# Patient Record
Sex: MALE | Race: WHITE | NOT HISPANIC OR LATINO | Employment: OTHER | URBAN - METROPOLITAN AREA
[De-identification: names, ages, dates, MRNs, and addresses within clinical notes are randomized per-mention and may not be internally consistent; named-entity substitution may affect disease eponyms.]

---

## 2017-05-04 ENCOUNTER — COMMUNICATION - HEALTHEAST (OUTPATIENT)
Dept: FAMILY MEDICINE | Facility: CLINIC | Age: 37
End: 2017-05-04

## 2017-05-04 ENCOUNTER — OFFICE VISIT (OUTPATIENT)
Dept: FAMILY MEDICINE | Facility: CLINIC | Age: 37
End: 2017-05-04
Payer: COMMERCIAL

## 2017-05-04 VITALS
DIASTOLIC BLOOD PRESSURE: 72 MMHG | OXYGEN SATURATION: 98 % | TEMPERATURE: 98.6 F | SYSTOLIC BLOOD PRESSURE: 107 MMHG | HEART RATE: 119 BPM | BODY MASS INDEX: 31.53 KG/M2 | WEIGHT: 210.4 LBS

## 2017-05-04 DIAGNOSIS — H65.02 ACUTE SEROUS OTITIS MEDIA OF LEFT EAR, RECURRENCE NOT SPECIFIED: Primary | ICD-10-CM

## 2017-05-04 DIAGNOSIS — K64.4 EXTERNAL HEMORRHOIDS: ICD-10-CM

## 2017-05-04 DIAGNOSIS — Z23 NEED FOR PROPHYLACTIC VACCINATION WITH TETANUS-DIPHTHERIA (TD): ICD-10-CM

## 2017-05-04 PROCEDURE — 99203 OFFICE O/P NEW LOW 30 MIN: CPT | Performed by: INTERNAL MEDICINE

## 2017-05-04 RX ORDER — AZITHROMYCIN 250 MG/1
TABLET, FILM COATED ORAL
Qty: 6 TABLET | Refills: 0 | Status: SHIPPED | OUTPATIENT
Start: 2017-05-04 | End: 2019-12-03

## 2017-05-04 NOTE — MR AVS SNAPSHOT
After Visit Summary   5/4/2017    Jona Lopez    MRN: [id number]           Patient Information     Date Of Birth          1980        Visit Information        Provider Department      5/4/2017 11:50 AM Ann Her MD [facility]        Today's Diagnoses     Need for prophylactic vaccination with tetanus-diphtheria (TD)    -  1    Acute serous otitis media of left ear, recurrence not specified        External hemorrhoids          Care Instructions    Treat this as you would treat a regular cold. If your symptoms are not better in 3 days, start to take the antibiotic.  If your symptoms become worse in the next 3 days, start to take the antibiotic. Otherwise, do not take the antibiotic.    For your hemorrhoids:  Please use the cream for up to 2 weeks-if you are not feeling a benefit after about 10 days, please contact our clinic and we will refer you to a General Surgeon.   Also see below:    Hemorrhoids    Hemorrhoids are swollen and inflamed veins inside the rectum and near the anus. The rectum is the last several inches of the colon. The anus is the passage between the rectum and the outside of the body.  Causes   The veins can become swollen due to increased pressure in them. This is most often caused by:    Chronic constipation or diarrhea    Straining when having a bowel movement    Sitting too long on the toilet    A low-fiber diet    Pregnancy  Symptoms     Bleeding from the rectum (this may be noticeable after bowel movements)    Lump near the anus    Itching around the anus    Pain around the anus  There are different types of hemorrhoids. Depending on the type you have and the severity, you may be able to treat yourself at home. In some cases, a procedure may be the best treatment option. Your healthcare provider can tell you more about this, if needed.  Home care  General care    To get relief from pain or itching, try:    Topical products. Your  healthcare provider may prescribe or recommend creams, ointments, or pads that can be applied to the hemorrhoid. Use these exactly as directed.    Medicines. Your healthcare provider may recommend stool softeners, suppositories, or laxatives to help manage constipation. Use these exactly as directed.    Sitz baths. A sitz bath involves sitting in a few inches of warm bath water. Be careful not to make the water so hot that you burn yourself--test it before sitting in it. Soak for about 10 to 15 minutes a few times a day. This may help relieve pain.  Tips to help prevent hemorrhoids    Eat more fiber. Fiber adds bulk to stool and absorbs water as it moves through your colon. This makes stool softer and easier to pass.    Increase the fiber in your diet with more fiber-rich foods. These include fresh fruit, vegetables, and whole grains.    Take a fiber supplement or bulking agent, if advised to by your provider. These include products such as psyllium or methylcellulose.    Drink plenty of water, if directed to by your provider. This can help keep stool soft.    Be more active. Frequent exercise aids digestion and helps prevent constipation. It may also help make bowel movements more regular.    Don t strain during bowel movements. This can make hemorrhoids more likely. Also, don t sit on the toilet for long periods of time.  Follow-up care  Follow up with your healthcare provider, or as advised. If a culture or imaging tests were done, you will be notified of the results when they are ready. This may take a few days or longer.  When to seek medical advice  Call your healthcare provider right away if any of these occur:    Increased bleeding from the rectum    Increased pain around the rectum or anus    Weakness or dizziness   Call 911   Call 911 or return to the emergency department right away if any of these occur:    Trouble breathing or swallowing    Fainting or loss of consciousness    Unusually fast heart  "rate    Vomiting blood    Large amounts of blood in stool      2604-8541 [employer]. [address] [place] [address], [place] [address]. All rights reserved. This information is not intended as a substitute for professional medical care. Always follow your healthcare professional's instructions.              Follow-ups after your visit        Who to contact     If you have questions or need follow up information about today's clinic visit or your schedule please contact [facility] directly at [phone].  Normal or non-critical lab and imaging results will be communicated to you by [employer], letter or phone within 4 business days after the clinic has received the results. If you do not hear from us within 7 days, please contact the clinic through [employer] or phone. If you have a critical or abnormal lab result, we will notify you by phone as soon as possible.  Submit refill requests through [employer] or call your pharmacy and they will forward the refill request to us. Please allow 3 business days for your refill to be completed.          Additional Information About Your Visit        [employer] Information     [employer] lets you send messages to your doctor, view your test results, renew your prescriptions, schedule appointments and more. To sign up, go to [email or web address] . Click on \"Log in\" on the left side of the screen, which will take you to the Welcome page. Then click on \"Sign up Now\" on the right side of the page.     You will be asked to enter the access code listed below, as well as some personal information. Please follow the directions to create your username and password.     Your access code is: [email or web address]  Expires: 2017 12:35 PM     Your access code will  in 90 days. If you need help or a new code, please call your [facility] or [phone].        Care EveryWhere ID     This is your Care EveryWhere ID. This could be used by other organizations to access your " [facility] medical records  [id number]        Your Vitals Were     Pulse Temperature Pulse Oximetry BMI (Body Mass Index)          119 98.6  F (37  C) (Oral) 98% 31.53 kg/m2         Blood Pressure from Last 3 Encounters:   05/04/17 107/72   07/26/12 117/77   07/12/12 128/68    Weight from Last 3 Encounters:   05/04/17 210 lb 6.4 oz (95.4 kg)   07/26/12 197 lb 2 oz (89.4 kg)   07/12/12 198 lb (89.8 kg)              Today, you had the following     No orders found for display         Today's Medication Changes          These changes are accurate as of: 5/4/17 12:35 PM.  If you have any questions, ask your nurse or doctor.               Start taking these medicines.        Dose/Directions    azithromycin 250 MG tablet   Commonly known as:  ZITHROMAX   Used for:  Acute serous otitis media of left ear, recurrence not specified   Started by:  Ann Her MD        Two tablets first day, then one tablet daily for four days.   Quantity:  6 tablet   Refills:  0       hydrocortisone 2.5 % cream   Commonly known as:  ANUSOL-HC   Used for:  External hemorrhoids   Started by:  Ann Her MD        Place rectally 2 times daily For up to 2 weeks.   Quantity:  30 g   Refills:  1            Where to get your medicines      These medications were sent to [place] [id number] [place] [id number]  [address] [facility] [address]     Phone:  [phone]     azithromycin 250 MG tablet    hydrocortisone 2.5 % cream                Primary Care Provider    Doctor Unknown, MD       No address on file        Thank you!     Thank you for choosing [facility]  for your care. Our goal is always to provide you with excellent care. Hearing back from our patients is one way we can continue to improve our services. Please take a few minutes to complete the written survey that you may receive in the mail after your visit with us. Thank you!             Your Updated Medication List -  Protect others around you: Learn how to safely use, store and throw away your medicines at www.disposemymeds.org.          This list is accurate as of: 5/4/17 12:35 PM.  Always use your most recent med list.                   Brand Name Dispense Instructions for use    azithromycin 250 MG tablet    ZITHROMAX    6 tablet    Two tablets first day, then one tablet daily for four days.       ciprofloxacin-dexamethasone otic suspension    CIPRODEX    2.8 mL    Place 4 drops into both ears 2 times daily.       hydrocortisone 2.5 % cream    ANUSOL-HC    30 g    Place rectally 2 times daily For up to 2 weeks.

## 2017-05-04 NOTE — PATIENT INSTRUCTIONS
Treat this as you would treat a regular cold. If your symptoms are not better in 3 days, start to take the antibiotic.  If your symptoms become worse in the next 3 days, start to take the antibiotic. Otherwise, do not take the antibiotic.    For your hemorrhoids:  Please use the cream for up to 2 weeks-if you are not feeling a benefit after about 10 days, please contact our clinic and we will refer you to a General Surgeon.   Also see below:    Hemorrhoids    Hemorrhoids are swollen and inflamed veins inside the rectum and near the anus. The rectum is the last several inches of the colon. The anus is the passage between the rectum and the outside of the body.  Causes   The veins can become swollen due to increased pressure in them. This is most often caused by:    Chronic constipation or diarrhea    Straining when having a bowel movement    Sitting too long on the toilet    A low-fiber diet    Pregnancy  Symptoms     Bleeding from the rectum (this may be noticeable after bowel movements)    Lump near the anus    Itching around the anus    Pain around the anus  There are different types of hemorrhoids. Depending on the type you have and the severity, you may be able to treat yourself at home. In some cases, a procedure may be the best treatment option. Your healthcare provider can tell you more about this, if needed.  Home care  General care    To get relief from pain or itching, try:    Topical products. Your healthcare provider may prescribe or recommend creams, ointments, or pads that can be applied to the hemorrhoid. Use these exactly as directed.    Medicines. Your healthcare provider may recommend stool softeners, suppositories, or laxatives to help manage constipation. Use these exactly as directed.    Sitz baths. A sitz bath involves sitting in a few inches of warm bath water. Be careful not to make the water so hot that you burn yourself--test it before sitting in it. Soak for about 10 to 15 minutes a few  times a day. This may help relieve pain.  Tips to help prevent hemorrhoids    Eat more fiber. Fiber adds bulk to stool and absorbs water as it moves through your colon. This makes stool softer and easier to pass.    Increase the fiber in your diet with more fiber-rich foods. These include fresh fruit, vegetables, and whole grains.    Take a fiber supplement or bulking agent, if advised to by your provider. These include products such as psyllium or methylcellulose.    Drink plenty of water, if directed to by your provider. This can help keep stool soft.    Be more active. Frequent exercise aids digestion and helps prevent constipation. It may also help make bowel movements more regular.    Don t strain during bowel movements. This can make hemorrhoids more likely. Also, don t sit on the toilet for long periods of time.  Follow-up care  Follow up with your healthcare provider, or as advised. If a culture or imaging tests were done, you will be notified of the results when they are ready. This may take a few days or longer.  When to seek medical advice  Call your healthcare provider right away if any of these occur:    Increased bleeding from the rectum    Increased pain around the rectum or anus    Weakness or dizziness   Call 911   Call 911 or return to the emergency department right away if any of these occur:    Trouble breathing or swallowing    Fainting or loss of consciousness    Unusually fast heart rate    Vomiting blood    Large amounts of blood in stool      1153-7926 [employer]. [address] [place] [address], [place] [address]. All rights reserved. This information is not intended as a substitute for professional medical care. Always follow your healthcare professional's instructions.

## 2017-05-04 NOTE — PROGRESS NOTES
"  SUBJECTIVE:                                                    Jona Lopez is a 36 year old male who presents to clinic today for the following health issues:        ENT Symptoms             Symptoms: cc Present Absent Comment   Fever/Chills   x    Fatigue   x    Muscle Aches  x     Eye Irritation   x    Sneezing   x    Nasal Sterling/Drg   x    Sinus Pressure/Pain   x    Loss of smell   x    Dental pain   x    Sore Throat   x    Swollen Glands  x  Left side   Ear Pain/Fullness   x Right ear: chronically leaking for 3 months-he went to an \"ear specialist-interm now, getting slowly better; left ear: \"ear wax has gotten more sticky\" No tinnitus. No drainage from the left ear.  No hearing loss noted.    Cough   x    Wheeze   x    Chest Pain   x    Shortness of breath   x    Rash   x    Other   x      Symptom duration:  4 days   Symptom severity: moderate   Treatments tried: none   Contacts: none     Also reports history of possible blood in his stools and he had a colonoscopy 12/2015, and since the colono (which per patient was itself within normal limits), he has felt something protrude from his rectum. No pain when he pushes it back in and sometimes a bit of blood on wiping.          Problem list and histories reviewed & adjusted, as indicated.  Additional history: as documented    Patient Active Problem List   Diagnosis     Disorder of skin or subcutaneous tissue     Past Surgical History:   Procedure Laterality Date     GENITOURINARY SURGERY      kidney stones       Social History   Substance Use Topics     Smoking status: Former Smoker     Packs/day: 3.00     Years: 5.00     Types: Cigarettes     Smokeless tobacco: Never Used     Alcohol use Yes      Comment: rarely     Family History   Problem Relation Age of Onset     HEART DISEASE Maternal Grandfather      HEART DISEASE Paternal Grandfather          Current Outpatient Prescriptions   Medication Sig Dispense Refill     azithromycin (ZITHROMAX) 250 MG tablet Two " tablets first day, then one tablet daily for four days. 6 tablet 0     hydrocortisone (ANUSOL-HC) 2.5 % cream Place rectally 2 times daily For up to 2 weeks. 30 g 1     ciprofloxacin-dexamethasone (CIPRODEX) otic suspension Place 4 drops into both ears 2 times daily. 2.8 mL 0       Reviewed and updated as needed this visit by clinical staff       Reviewed and updated as needed this visit by Provider           ==============================================================  ROS:  Constitutional, HEENT, cardiovascular, pulmonary, GI, , musculoskeletal, neuro, skin, endocrine and psych systems are negative, except as otherwise noted.       OBJECTIVE:                                                    /72 (BP Location: Left arm, Patient Position: Chair, Cuff Size: Adult Large)  Pulse 119  Temp 98.6  F (37  C) (Oral)  Wt 210 lb 6.4 oz (95.4 kg)  SpO2 98%  BMI 31.53 kg/m2  Body mass index is 31.53 kg/(m^2).     GENERAL APPEARANCE: healthy, alert and in no distress  EYES: Eyes grossly normal to inspection, and conjunctivae and sclerae normal  HENT: mild erythema of the left  ear canal and right ear canal and TM's normal, nose and mouth without ulcers or lesions, oropharynx clear and oral mucous membranes moist  NECK: no adenopathy, no asymmetry, masses, or scars   RESP: lungs clear to auscultation - no rales, rhonchi or wheezes  CV: regular rate and rhythm, normal S1 S2, no S3 or S4, no murmur, click or rub, no peripheral edema and peripheral pulses strong  ABDOMEN: soft, nontender, no hepatosplenomegaly, no masses and bowel sounds normal  RECTAL: normal sphincter tone, a 1.5cm hemorrhoid, o/w no rectal masses, prostate of normal size, smooth, nontender without nodules or masses  MS: no musculoskeletal defects are noted and gait is age appropriate without ataxia  SKIN: no suspicious lesions or rashes  NEURO: mentation intact and speech normal  PSYCH: mentation appears normal and affect normal/bright.          ASSESSMENT/PLAN:                                                        ICD-10-CM    1. Acute serous otitis media of left ear, recurrence not specified H65.02 azithromycin (ZITHROMAX) 250 MG tablet   2. External hemorrhoids K64.4 hydrocortisone (ANUSOL-HC) 2.5 % cream   3. Need for prophylactic vaccination with tetanus-diphtheria (TD) Z23      (H65.02) Acute serous otitis media of left ear, recurrence not specified  (primary encounter diagnosis)  Comment: most c/w viral URTI  Plan:  As per orders above and patient instructions below.    azithromycin (ZITHROMAX) 250 MG tablet            (K64.4) External hemorrhoids  Comment: as per hPI  Plan: hydrocortisone (ANUSOL-HC) 2.5 % cream              Patient Instructions   Treat this as you would treat a regular cold. If your symptoms are not better in 3 days, start to take the antibiotic.  If your symptoms become worse in the next 3 days, start to take the antibiotic. Otherwise, do not take the antibiotic.    For your hemorrhoids:  Please use the cream for up to 2 weeks-if you are not feeling a benefit after about 10 days, please contact our clinic and we will refer you to a General Surgeon.   Also see below:    Hemorrhoids    Hemorrhoids are swollen and inflamed veins inside the rectum and near the anus. The rectum is the last several inches of the colon. The anus is the passage between the rectum and the outside of the body.  Causes   The veins can become swollen due to increased pressure in them. This is most often caused by:    Chronic constipation or diarrhea    Straining when having a bowel movement    Sitting too long on the toilet    A low-fiber diet    Pregnancy  Symptoms     Bleeding from the rectum (this may be noticeable after bowel movements)    Lump near the anus    Itching around the anus    Pain around the anus  There are different types of hemorrhoids. Depending on the type you have and the severity, you may be able to treat yourself at home. In some  cases, a procedure may be the best treatment option. Your healthcare provider can tell you more about this, if needed.  Home care  General care    To get relief from pain or itching, try:    Topical products. Your healthcare provider may prescribe or recommend creams, ointments, or pads that can be applied to the hemorrhoid. Use these exactly as directed.    Medicines. Your healthcare provider may recommend stool softeners, suppositories, or laxatives to help manage constipation. Use these exactly as directed.    Sitz baths. A sitz bath involves sitting in a few inches of warm bath water. Be careful not to make the water so hot that you burn yourself--test it before sitting in it. Soak for about 10 to 15 minutes a few times a day. This may help relieve pain.  Tips to help prevent hemorrhoids    Eat more fiber. Fiber adds bulk to stool and absorbs water as it moves through your colon. This makes stool softer and easier to pass.    Increase the fiber in your diet with more fiber-rich foods. These include fresh fruit, vegetables, and whole grains.    Take a fiber supplement or bulking agent, if advised to by your provider. These include products such as psyllium or methylcellulose.    Drink plenty of water, if directed to by your provider. This can help keep stool soft.    Be more active. Frequent exercise aids digestion and helps prevent constipation. It may also help make bowel movements more regular.    Don t strain during bowel movements. This can make hemorrhoids more likely. Also, don t sit on the toilet for long periods of time.  Follow-up care  Follow up with your healthcare provider, or as advised. If a culture or imaging tests were done, you will be notified of the results when they are ready. This may take a few days or longer.  When to seek medical advice  Call your healthcare provider right away if any of these occur:    Increased bleeding from the rectum    Increased pain around the rectum or  anus    Weakness or dizziness   Call 911   Call 911 or return to the emergency department right away if any of these occur:    Trouble breathing or swallowing    Fainting or loss of consciousness    Unusually fast heart rate    Vomiting blood    Large amounts of blood in stool      2798-5777 [employer]. [address] [place] [address]. All rights reserved. This information is not intended as a substitute for professional medical care. Always follow your healthcare professional's instructions.                      Ann Her MD  [facility]

## 2017-05-04 NOTE — NURSING NOTE
"Chief Complaint   Patient presents with     Throat Pain       Initial /72 (BP Location: Left arm, Patient Position: Chair, Cuff Size: Adult Large)  Pulse 119  Temp 98.6  F (37  C) (Oral)  Wt 210 lb 6.4 oz (95.4 kg)  SpO2 98%  BMI 31.53 kg/m2 Estimated body mass index is 31.53 kg/(m^2) as calculated from the following:    Height as of 7/12/12: 5' 8.5\" (1.74 m).    Weight as of this encounter: 210 lb 6.4 oz (95.4 kg).  Medication Reconciliation: complete    Nidhi Braun CMA  "

## 2019-10-29 ENCOUNTER — OFFICE VISIT - HEALTHEAST (OUTPATIENT)
Dept: FAMILY MEDICINE | Facility: CLINIC | Age: 39
End: 2019-10-29

## 2019-10-29 DIAGNOSIS — R21 RASH AND NONSPECIFIC SKIN ERUPTION: ICD-10-CM

## 2019-10-29 ASSESSMENT — MIFFLIN-ST. JEOR: SCORE: 1864.04

## 2019-12-02 NOTE — PROGRESS NOTES
Subjective     Jona Lopez is a 39 year old male who presents to clinic today for the following health issues:    HPI   Acute Illness   Acute illness concerns: ear pain  Onset: 1 week    Fever: no     Chills/Sweats: no     Headache (location?): YES    Sinus Pressure:YES    Conjunctivitis:  no    Ear Pain: YES- both    Rhinorrhea: YES    Congestion: YES    Sore Throat: no  Sinus Pressure   Cough: YES mild Nonprodutive     Wheeze: no    Decreased Appetite: no    Nausea: no    Vomiting: no    Diarrhea:  no    Dysuria/Freq.: no    Fatigue/Achiness: no    Sick/Strep Exposure: no     Therapies Tried and outcome: drop for ear    Rash  Onset: 1 month    Description:   Location: butt  Character: flakey, painful, burning  Itching (Pruritis): YES    Progression of Symptoms:  same    Accompanying Signs & Symptoms:  Fever: no   Body aches or joint pain: no   Sore throat symptoms: no   Recent cold symptoms: YES    History:   Previous similar rash: YES    Precipitating factors:   Exposure to similar rash: no   New exposures: None   Recent travel: no     Alleviating factors:    Has Not been in a Hot Tub    Has been Using OTC  Diaper rash creams  Therapies Tried and outcome: cream    Patient Active Problem List   Diagnosis     Disorder of skin or subcutaneous tissue     Past Surgical History:   Procedure Laterality Date     GENITOURINARY SURGERY      kidney stones       Social History     Tobacco Use     Smoking status: Former Smoker     Packs/day: 3.00     Years: 5.00     Pack years: 15.00     Types: Cigarettes     Smokeless tobacco: Never Used   Substance Use Topics     Alcohol use: Yes     Comment: rarely     Family History   Problem Relation Age of Onset     Heart Disease Maternal Grandfather      Heart Disease Paternal Grandfather          Current Outpatient Medications   Medication Sig Dispense Refill     amoxicillin-clavulanate (AUGMENTIN) 875-125 MG tablet Take 1 tablet by mouth 2 times daily for 10 days 20 tablet 0      "ciprofloxacin-dexamethasone (CIPRODEX) 0.3-0.1 % otic suspension Place 4 drops into both ears 2 times daily 2.8 mL 0     hydrocortisone (ANUSOL-HC) 2.5 % cream Place rectally 2 times daily For up to 2 weeks. 30 g 1     No Known Allergies  No lab results found.   BP Readings from Last 3 Encounters:   12/03/19 120/84   05/04/17 107/72   07/26/12 117/77    Wt Readings from Last 3 Encounters:   12/03/19 98.9 kg (218 lb)   05/04/17 95.4 kg (210 lb 6.4 oz)   07/26/12 89.4 kg (197 lb 2 oz)                      Reviewed and updated as needed this visit by Provider  Tobacco  Allergies  Meds  Problems  Med Hx  Surg Hx  Fam Hx         Review of Systems   ROS COMP: CONSTITUTIONAL: NEGATIVE for fever, chills, change in weight  INTEGUMENTARY/SKIN: NEGATIVE for worrisome rashes, moles or lesions  ENT/MOUTH: as above  RESP:as above  CV: NEGATIVE for chest pain, palpitations or peripheral edema  GI: NEGATIVE for nausea, abdominal pain, heartburn, or change in bowel habits  MUSCULOSKELETAL: NEGATIVE for significant arthralgias or myalgia  ROS otherwise negative      Objective    /84   Pulse 84   Temp 98  F (36.7  C)   Resp 15   Ht 1.74 m (5' 8.5\")   Wt 98.9 kg (218 lb)   SpO2 97%   BMI 32.66 kg/m    Body mass index is 32.66 kg/m .  Physical Exam   GENERAL: healthy, alert and no distress  EYES: Eyes grossly normal to inspection, PERRL and conjunctivae and sclerae normal  HENT: ear canals and TM's normal, nose congestionnd mouth without ulcers or lesions  NECK: no adenopathy, no asymmetry, masses, or scars and thyroid normal to palpation  RESP: lungs clear to auscultation - no rales, rhonchi or wheezes  CV: regular rate and rhythm, normal S1 S2, no S3 or S4, no murmur, click or rub, no peripheral edema and peripheral pulses strong  ABDOMEN: soft, nontender, no hepatosplenomegaly, no masses and bowel sounds normal  MS: no gross musculoskeletal defects noted, no edema  Mild Folliculitis Gluteal area    Diagnostic Test " Results:  Labs reviewed in Epic        Assessment & Plan     1. Sinusitis, unspecified chronicity, unspecified location  SEE [facility] care orders  The potential side effects of this medication have been discussed with the patient.  Call if any significant problems with these are experienced.  Follow up 1 week if not better/sooner if worse    - amoxicillin-clavulanate (AUGMENTIN) 875-125 MG tablet; Take 1 tablet by mouth 2 times daily for 10 days  Dispense: 20 tablet; Refill: 0    2. Chronic otitis externa, unspecified laterality, unspecified type  Refilled as he gets External Otitis   - ciprofloxacin-dexamethasone (CIPRODEX) 0.3-0.1 % otic suspension; Place 4 drops into both ears 2 times daily  Dispense: 2.8 mL; Refill: 0    3. Rash  Folliculitis vs Contact dermatitis  We have Given antibiotics  If not better can Try Doxycycline and Follow-up with Dermatology       Return in about 1 month (around 1/3/2020) for Physical Exam.    Rosanne Aguirre MD  [facility]

## 2019-12-03 ENCOUNTER — OFFICE VISIT (OUTPATIENT)
Dept: FAMILY MEDICINE | Facility: CLINIC | Age: 39
End: 2019-12-03
Payer: COMMERCIAL

## 2019-12-03 VITALS
HEIGHT: 69 IN | OXYGEN SATURATION: 97 % | WEIGHT: 218 LBS | RESPIRATION RATE: 15 BRPM | DIASTOLIC BLOOD PRESSURE: 84 MMHG | TEMPERATURE: 98 F | SYSTOLIC BLOOD PRESSURE: 120 MMHG | HEART RATE: 84 BPM | BODY MASS INDEX: 32.29 KG/M2

## 2019-12-03 DIAGNOSIS — H60.60 CHRONIC OTITIS EXTERNA, UNSPECIFIED LATERALITY, UNSPECIFIED TYPE: ICD-10-CM

## 2019-12-03 DIAGNOSIS — J32.9 SINUSITIS, UNSPECIFIED CHRONICITY, UNSPECIFIED LOCATION: ICD-10-CM

## 2019-12-03 DIAGNOSIS — R21 RASH: ICD-10-CM

## 2019-12-03 PROCEDURE — 99203 OFFICE O/P NEW LOW 30 MIN: CPT | Performed by: FAMILY MEDICINE

## 2019-12-03 RX ORDER — CIPROFLOXACIN AND DEXAMETHASONE 3; 1 MG/ML; MG/ML
4 SUSPENSION/ DROPS AURICULAR (OTIC) 2 TIMES DAILY
Qty: 2.8 ML | Refills: 0 | Status: SHIPPED | OUTPATIENT
Start: 2019-12-03

## 2019-12-03 ASSESSMENT — MIFFLIN-ST. JEOR: SCORE: 1886.28

## 2019-12-03 NOTE — PATIENT INSTRUCTIONS
[facility]    If you have any questions regarding to your visit please contact your care team:       Team Red:   Clinic Hours Telephone Number   Dr. Rosanne eLe, NP   7am-7pm  Monday - Thursday   7am-5pm  Fridays  [phone]  (Appointment scheduling available 24/7)    Questions about your recent visit?   Team Line  [phone]   Urgent Care - [place] [facility] - 11am-9pm Monday-Friday Saturday-Sunday- 9am-5pm   [place] - 5pm-9pm Monday-Friday Saturday-Sunday- 9am-5pm  [phone] - [place]  [phone] - [place]       What options do I have for a visit other than an office visit? We offer electronic visits (e-visits) and telephone visits, when medically appropriate.  Please check with your medical insurance to see if these types of visits are covered, as you will be responsible for any charges that are not paid by your insurance.      You can use [employer] (secure electronic communication) to access to your chart, send your primary care provider a message, or make an appointment. Ask a team member how to get started.     For a price quote for your services, please call our Consumer Price Line at [phone] or our Imaging Cost estimation line at [phone] (for imaging tests).

## 2020-07-07 ENCOUNTER — OFFICE VISIT - HEALTHEAST (OUTPATIENT)
Dept: FAMILY MEDICINE | Facility: CLINIC | Age: 40
End: 2020-07-07

## 2020-07-07 DIAGNOSIS — H60.391 INFECTIVE OTITIS EXTERNA, RIGHT: ICD-10-CM

## 2021-06-02 NOTE — PROGRESS NOTES
"   Assessment/ Plan:         1. Rash and nonspecific skin eruption  cephalexin (KEFLEX) 500 MG capsule     Patient seen today for furuncle/rash of his buttocks skin.  Etiology of this is unclear.  Will start patient on cephalexin to treat possible cellulitis recommend that he follow-up if symptoms are not improving.  Also recommended calmoseptine to be applied to the rash itself to help with pain management and reduction of symptoms.  He is in agreement this plan will follow-up if symptoms are not improving.      Subjective:      Jona Lopez is a 38 y.o. male who presents for concerns of rash on buttocks. The rash has been present for about 3 weeks.  . He reports that this originally started pimple-like and now it has erupted and is thick firm blisterlike that has popped and very painful.  Is present on both sides of his buttocks.  He denies any cysts or significant drainage from the rash.  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  He denies any previous symptoms similar.  No other skin changes have been present.  He denies any new or recent activity.  No new clothing or changes in activity.    The following portions of the patient's history were reviewed and updated as appropriate: allergies, current medications and problem list.    Patient Active Problem List   Diagnosis     Otitis Externa       Current Outpatient Medications   Medication Sig     cephalexin (KEFLEX) 500 MG capsule Take 1 capsule (500 mg total) by mouth 3 (three) times a day for 10 days.     SUMAtriptan (IMITREX) 100 MG tablet TAKE 1 TAB BY MOUTH AT ONSET, THEN TAKE 1 IN 2 HOURS AS NEEDED. MAX 2 TABS PER DAY, 3 DAYS PER WEEK       Review of Systems   Pertinent items are noted in HPI.      Objective:      /86   Pulse 64   Temp 98  F (36.7  C)   Ht 5' 8.5\" (1.74 m)   Wt 214 lb 3.2 oz (97.2 kg)   BMI 32.10 kg/m      General appearance: alert, appears stated age and cooperative  Head: Normocephalic, without obvious abnormality, " atraumatic  Skin: Evaluation of skin on bilateral buttocks and crease reveals thickened furuncle with blistering that has erupted mild erythremia and swelling present but which is acutely tender to touch.  Each lesion is approximately 1 to 2 cm in length appearance started out as this inked separate lesions but have now become confluent.  No discharge noted on exam today.  Neurologic: Grossly normal      Ella Hernandez, ROLANDA  1:45 PM

## 2021-06-03 VITALS
HEIGHT: 69 IN | HEART RATE: 64 BPM | BODY MASS INDEX: 31.73 KG/M2 | WEIGHT: 214.2 LBS | TEMPERATURE: 98 F | DIASTOLIC BLOOD PRESSURE: 86 MMHG | SYSTOLIC BLOOD PRESSURE: 128 MMHG

## 2021-06-09 NOTE — PROGRESS NOTES
"Jona Lopez is a 39 y.o. male who is being evaluated via a billable telephone visit.      The patient has been notified of following:     \"This telephone visit will be conducted via a call between you and your physician/provider. We have found that certain health care needs can be provided without the need for a physical exam.  This service lets us provide the care you need with a short phone conversation.  If a prescription is necessary we can send it directly to your pharmacy.  If lab work is needed we can place an order for that and you can then stop by our lab to have the test done at a later time.    Telephone visits are billed at different rates depending on your insurance coverage. During this emergency period, for some insurers they may be billed the same as an in-person visit.  Please reach out to your insurance provider with any questions.    If during the course of the call the physician/provider feels a telephone visit is not appropriate, you will not be charged for this service.\"    Patient has given verbal consent to a Telephone visit? Yes    What phone number would you like to be contacted at? [phone]    Patient would like to receive their AVS by     HPI - 40 yo male with ear pain for 2 days.   He has right ear pain for the last few days.   He has h/o recurrent ear infections, usually treated with ciprodex drops. But ran out.   H/o otitis externa   Not swimming   No fever or any other sx    He has no primary care doctor but Dr Jordan is primary for his wife and kids.     Patient Active Problem List   Diagnosis     Otitis Externa     Current Outpatient Medications   Medication Sig     SUMAtriptan (IMITREX) 100 MG tablet TAKE 1 TAB BY MOUTH AT ONSET, THEN TAKE 1 IN 2 HOURS AS NEEDED. MAX 2 TABS PER DAY, 3 DAYS PER WEEK           Assessment/Plan:  1. Infective otitis externa, right  Per chart and patient report, he has h/o of recurrent otitis externa  - ciprofloxacin-dexamethasone (CIPRODEX) " otic suspension; Administer 4 drops to the right ear 2 (two) times a day.  Dispense: 7.5 mL; Refill: 0    I declined ongoing refills without an exam or having ever met him. Encouraged him to establish primary care and perhaps Dr Jordan who sees his family will allow him to be added to his panel.     Phone call duration:  7 minutes    Dr. Cherelle Watson  7/7/2020

## 2024-08-22 ENCOUNTER — ANESTHESIA EVENT (OUTPATIENT)
Dept: SURGERY | Facility: CLINIC | Age: 44
End: 2024-08-22
Payer: COMMERCIAL

## 2024-08-22 ENCOUNTER — HOSPITAL ENCOUNTER (OUTPATIENT)
Facility: CLINIC | Age: 44
Discharge: HOME | End: 2024-08-22
Attending: COLON & RECTAL SURGERY | Admitting: COLON & RECTAL SURGERY
Payer: COMMERCIAL

## 2024-08-22 ENCOUNTER — ANESTHESIA (OUTPATIENT)
Dept: SURGERY | Facility: CLINIC | Age: 44
End: 2024-08-22
Payer: COMMERCIAL

## 2024-08-22 VITALS
BODY MASS INDEX: 32.12 KG/M2 | DIASTOLIC BLOOD PRESSURE: 95 MMHG | WEIGHT: 211.9 LBS | SYSTOLIC BLOOD PRESSURE: 155 MMHG | HEART RATE: 60 BPM | HEIGHT: 68 IN | TEMPERATURE: 97.4 F | OXYGEN SATURATION: 98 % | RESPIRATION RATE: 20 BRPM

## 2024-08-22 DIAGNOSIS — K64.8 INTERNAL AND EXTERNAL THROMBOSED HEMORRHOIDS: ICD-10-CM

## 2024-08-22 DIAGNOSIS — K64.5 INTERNAL AND EXTERNAL THROMBOSED HEMORRHOIDS: ICD-10-CM

## 2024-08-22 DIAGNOSIS — L98.9 DISORDER OF SKIN OR SUBCUTANEOUS TISSUE: Primary | ICD-10-CM

## 2024-08-22 PROCEDURE — 46260 REMOVE IN/EX HEM GROUPS 2+: CPT | Performed by: ANESTHESIOLOGY

## 2024-08-22 PROCEDURE — 46260 REMOVE IN/EX HEM GROUPS 2+: CPT

## 2024-08-22 PROCEDURE — 360N000074 HC SURGERY LEVEL 1, PER MIN: Performed by: COLON & RECTAL SURGERY

## 2024-08-22 PROCEDURE — 250N000009 HC RX 250

## 2024-08-22 PROCEDURE — 710N000009 HC RECOVERY PHASE 1, LEVEL 1, PER MIN: Performed by: COLON & RECTAL SURGERY

## 2024-08-22 PROCEDURE — 250N000011 HC RX IP 250 OP 636: Performed by: ANESTHESIOLOGY

## 2024-08-22 PROCEDURE — 250N000009 HC RX 250: Performed by: COLON & RECTAL SURGERY

## 2024-08-22 PROCEDURE — 88304 TISSUE EXAM BY PATHOLOGIST: CPT | Mod: TC | Performed by: COLON & RECTAL SURGERY

## 2024-08-22 PROCEDURE — 250N000011 HC RX IP 250 OP 636

## 2024-08-22 PROCEDURE — 88304 TISSUE EXAM BY PATHOLOGIST: CPT | Mod: 26 | Performed by: PATHOLOGY

## 2024-08-22 PROCEDURE — 250N000025 HC SEVOFLURANE, PER MIN: Performed by: COLON & RECTAL SURGERY

## 2024-08-22 PROCEDURE — 258N000003 HC RX IP 258 OP 636: Performed by: ANESTHESIOLOGY

## 2024-08-22 PROCEDURE — 370N000017 HC ANESTHESIA TECHNICAL FEE, PER MIN: Performed by: COLON & RECTAL SURGERY

## 2024-08-22 PROCEDURE — 250N000013 HC RX MED GY IP 250 OP 250 PS 637: Performed by: COLON & RECTAL SURGERY

## 2024-08-22 PROCEDURE — 999N000141 HC STATISTIC PRE-PROCEDURE NURSING ASSESSMENT: Performed by: COLON & RECTAL SURGERY

## 2024-08-22 PROCEDURE — 258N000003 HC RX IP 258 OP 636

## 2024-08-22 PROCEDURE — 250N000009 HC RX 250: Performed by: ANESTHESIOLOGY

## 2024-08-22 PROCEDURE — 272N000001 HC OR GENERAL SUPPLY STERILE: Performed by: COLON & RECTAL SURGERY

## 2024-08-22 PROCEDURE — 710N000012 HC RECOVERY PHASE 2, PER MINUTE: Performed by: COLON & RECTAL SURGERY

## 2024-08-22 RX ORDER — BUPIVACAINE HYDROCHLORIDE AND EPINEPHRINE 2.5; 5 MG/ML; UG/ML
INJECTION, SOLUTION INFILTRATION; PERINEURAL PRN
Status: DISCONTINUED | OUTPATIENT
Start: 2024-08-22 | End: 2024-08-22 | Stop reason: HOSPADM

## 2024-08-22 RX ORDER — DEXAMETHASONE SODIUM PHOSPHATE 4 MG/ML
4 INJECTION, SOLUTION INTRA-ARTICULAR; INTRALESIONAL; INTRAMUSCULAR; INTRAVENOUS; SOFT TISSUE
Status: DISCONTINUED | OUTPATIENT
Start: 2024-08-22 | End: 2024-08-22 | Stop reason: HOSPADM

## 2024-08-22 RX ORDER — DIAZEPAM 5 MG
5 TABLET ORAL EVERY 6 HOURS PRN
Qty: 10 TABLET | Refills: 0 | Status: SHIPPED | OUTPATIENT
Start: 2024-08-22

## 2024-08-22 RX ORDER — MAGNESIUM HYDROXIDE 1200 MG/15ML
LIQUID ORAL PRN
Status: DISCONTINUED | OUTPATIENT
Start: 2024-08-22 | End: 2024-08-22 | Stop reason: HOSPADM

## 2024-08-22 RX ORDER — FENTANYL CITRATE 0.05 MG/ML
50 INJECTION, SOLUTION INTRAMUSCULAR; INTRAVENOUS
Status: DISCONTINUED | OUTPATIENT
Start: 2024-08-22 | End: 2024-08-22 | Stop reason: HOSPADM

## 2024-08-22 RX ORDER — SODIUM CHLORIDE, SODIUM LACTATE, POTASSIUM CHLORIDE, CALCIUM CHLORIDE 600; 310; 30; 20 MG/100ML; MG/100ML; MG/100ML; MG/100ML
INJECTION, SOLUTION INTRAVENOUS CONTINUOUS
Status: DISCONTINUED | OUTPATIENT
Start: 2024-08-22 | End: 2024-08-22 | Stop reason: HOSPADM

## 2024-08-22 RX ORDER — FENTANYL CITRATE 50 UG/ML
INJECTION, SOLUTION INTRAMUSCULAR; INTRAVENOUS PRN
Status: DISCONTINUED | OUTPATIENT
Start: 2024-08-22 | End: 2024-08-22

## 2024-08-22 RX ORDER — OXYCODONE HYDROCHLORIDE 5 MG/1
5 TABLET ORAL
Status: COMPLETED | OUTPATIENT
Start: 2024-08-22 | End: 2024-08-22

## 2024-08-22 RX ORDER — HYDROMORPHONE HCL IN WATER/PF 6 MG/30 ML
0.2 PATIENT CONTROLLED ANALGESIA SYRINGE INTRAVENOUS EVERY 5 MIN PRN
Status: DISCONTINUED | OUTPATIENT
Start: 2024-08-22 | End: 2024-08-22 | Stop reason: HOSPADM

## 2024-08-22 RX ORDER — ONDANSETRON 2 MG/ML
4 INJECTION INTRAMUSCULAR; INTRAVENOUS EVERY 30 MIN PRN
Status: DISCONTINUED | OUTPATIENT
Start: 2024-08-22 | End: 2024-08-22 | Stop reason: HOSPADM

## 2024-08-22 RX ORDER — DEXAMETHASONE SODIUM PHOSPHATE 4 MG/ML
INJECTION, SOLUTION INTRA-ARTICULAR; INTRALESIONAL; INTRAMUSCULAR; INTRAVENOUS; SOFT TISSUE PRN
Status: DISCONTINUED | OUTPATIENT
Start: 2024-08-22 | End: 2024-08-22

## 2024-08-22 RX ORDER — ONDANSETRON 2 MG/ML
INJECTION INTRAMUSCULAR; INTRAVENOUS PRN
Status: DISCONTINUED | OUTPATIENT
Start: 2024-08-22 | End: 2024-08-22

## 2024-08-22 RX ORDER — HYDROMORPHONE HCL IN WATER/PF 6 MG/30 ML
0.4 PATIENT CONTROLLED ANALGESIA SYRINGE INTRAVENOUS EVERY 5 MIN PRN
Status: DISCONTINUED | OUTPATIENT
Start: 2024-08-22 | End: 2024-08-22 | Stop reason: HOSPADM

## 2024-08-22 RX ORDER — FENTANYL CITRATE 50 UG/ML
25 INJECTION, SOLUTION INTRAMUSCULAR; INTRAVENOUS EVERY 5 MIN PRN
Status: DISCONTINUED | OUTPATIENT
Start: 2024-08-22 | End: 2024-08-22 | Stop reason: HOSPADM

## 2024-08-22 RX ORDER — PROPOFOL 10 MG/ML
INJECTION, EMULSION INTRAVENOUS CONTINUOUS PRN
Status: DISCONTINUED | OUTPATIENT
Start: 2024-08-22 | End: 2024-08-22

## 2024-08-22 RX ORDER — NALOXONE HYDROCHLORIDE 0.4 MG/ML
0.1 INJECTION, SOLUTION INTRAMUSCULAR; INTRAVENOUS; SUBCUTANEOUS
Status: DISCONTINUED | OUTPATIENT
Start: 2024-08-22 | End: 2024-08-22 | Stop reason: HOSPADM

## 2024-08-22 RX ORDER — DEXMEDETOMIDINE HYDROCHLORIDE 4 UG/ML
INJECTION, SOLUTION INTRAVENOUS PRN
Status: DISCONTINUED | OUTPATIENT
Start: 2024-08-22 | End: 2024-08-22

## 2024-08-22 RX ORDER — PROPOFOL 10 MG/ML
INJECTION, EMULSION INTRAVENOUS PRN
Status: DISCONTINUED | OUTPATIENT
Start: 2024-08-22 | End: 2024-08-22

## 2024-08-22 RX ORDER — DIAPER,BRIEF,INFANT-TODD,DISP
EACH MISCELLANEOUS PRN
Status: DISCONTINUED | OUTPATIENT
Start: 2024-08-22 | End: 2024-08-22 | Stop reason: HOSPADM

## 2024-08-22 RX ORDER — ONDANSETRON 4 MG/1
4 TABLET, ORALLY DISINTEGRATING ORAL EVERY 30 MIN PRN
Status: DISCONTINUED | OUTPATIENT
Start: 2024-08-22 | End: 2024-08-22 | Stop reason: HOSPADM

## 2024-08-22 RX ORDER — SODIUM CHLORIDE, SODIUM LACTATE, POTASSIUM CHLORIDE, CALCIUM CHLORIDE 600; 310; 30; 20 MG/100ML; MG/100ML; MG/100ML; MG/100ML
INJECTION, SOLUTION INTRAVENOUS CONTINUOUS PRN
Status: DISCONTINUED | OUTPATIENT
Start: 2024-08-22 | End: 2024-08-22

## 2024-08-22 RX ORDER — FENTANYL CITRATE 50 UG/ML
50 INJECTION, SOLUTION INTRAMUSCULAR; INTRAVENOUS EVERY 5 MIN PRN
Status: DISCONTINUED | OUTPATIENT
Start: 2024-08-22 | End: 2024-08-22 | Stop reason: HOSPADM

## 2024-08-22 RX ORDER — SCOLOPAMINE TRANSDERMAL SYSTEM 1 MG/1
1 PATCH, EXTENDED RELEASE TRANSDERMAL ONCE
Status: DISCONTINUED | OUTPATIENT
Start: 2024-08-22 | End: 2024-08-22 | Stop reason: HOSPADM

## 2024-08-22 RX ORDER — OXYCODONE HYDROCHLORIDE 5 MG/1
5 TABLET ORAL EVERY 6 HOURS PRN
Qty: 20 TABLET | Refills: 0 | Status: SHIPPED | OUTPATIENT
Start: 2024-08-22

## 2024-08-22 RX ORDER — LIDOCAINE HYDROCHLORIDE 20 MG/ML
INJECTION, SOLUTION INFILTRATION; PERINEURAL PRN
Status: DISCONTINUED | OUTPATIENT
Start: 2024-08-22 | End: 2024-08-22

## 2024-08-22 RX ADMIN — SODIUM CHLORIDE, POTASSIUM CHLORIDE, SODIUM LACTATE AND CALCIUM CHLORIDE: 600; 310; 30; 20 INJECTION, SOLUTION INTRAVENOUS at 14:16

## 2024-08-22 RX ADMIN — SCOPALAMINE 1 PATCH: 1 PATCH, EXTENDED RELEASE TRANSDERMAL at 14:16

## 2024-08-22 RX ADMIN — OXYCODONE HYDROCHLORIDE 5 MG: 5 TABLET ORAL at 16:52

## 2024-08-22 RX ADMIN — PROPOFOL 200 MG: 10 INJECTION, EMULSION INTRAVENOUS at 14:39

## 2024-08-22 RX ADMIN — PROPOFOL 30 MCG/KG/MIN: 10 INJECTION, EMULSION INTRAVENOUS at 14:55

## 2024-08-22 RX ADMIN — HYDROMORPHONE HYDROCHLORIDE 0.4 MG: 0.2 INJECTION, SOLUTION INTRAMUSCULAR; INTRAVENOUS; SUBCUTANEOUS at 16:50

## 2024-08-22 RX ADMIN — DEXMEDETOMIDINE HYDROCHLORIDE 8 MCG: 200 INJECTION INTRAVENOUS at 15:28

## 2024-08-22 RX ADMIN — DEXAMETHASONE SODIUM PHOSPHATE 4 MG: 4 INJECTION, SOLUTION INTRA-ARTICULAR; INTRALESIONAL; INTRAMUSCULAR; INTRAVENOUS; SOFT TISSUE at 14:39

## 2024-08-22 RX ADMIN — FENTANYL CITRATE 100 MCG: 50 INJECTION INTRAMUSCULAR; INTRAVENOUS at 14:39

## 2024-08-22 RX ADMIN — LIDOCAINE HYDROCHLORIDE 100 MG: 20 INJECTION, SOLUTION INFILTRATION; PERINEURAL at 14:39

## 2024-08-22 RX ADMIN — DEXMEDETOMIDINE HYDROCHLORIDE 4 MCG: 200 INJECTION INTRAVENOUS at 15:20

## 2024-08-22 RX ADMIN — MIDAZOLAM 2 MG: 1 INJECTION INTRAMUSCULAR; INTRAVENOUS at 14:36

## 2024-08-22 RX ADMIN — FENTANYL CITRATE 50 MCG: 50 INJECTION, SOLUTION INTRAMUSCULAR; INTRAVENOUS at 16:06

## 2024-08-22 RX ADMIN — ROCURONIUM BROMIDE 50 MG: 50 INJECTION, SOLUTION INTRAVENOUS at 14:39

## 2024-08-22 RX ADMIN — HYDROMORPHONE HYDROCHLORIDE 0.4 MG: 0.2 INJECTION, SOLUTION INTRAMUSCULAR; INTRAVENOUS; SUBCUTANEOUS at 16:32

## 2024-08-22 RX ADMIN — SODIUM CHLORIDE, POTASSIUM CHLORIDE, SODIUM LACTATE AND CALCIUM CHLORIDE: 600; 310; 30; 20 INJECTION, SOLUTION INTRAVENOUS at 15:14

## 2024-08-22 RX ADMIN — PHENYLEPHRINE HYDROCHLORIDE 100 MCG: 10 INJECTION INTRAVENOUS at 15:04

## 2024-08-22 RX ADMIN — SODIUM CHLORIDE, POTASSIUM CHLORIDE, SODIUM LACTATE AND CALCIUM CHLORIDE: 600; 310; 30; 20 INJECTION, SOLUTION INTRAVENOUS at 14:36

## 2024-08-22 RX ADMIN — DEXMEDETOMIDINE HYDROCHLORIDE 8 MCG: 200 INJECTION INTRAVENOUS at 15:17

## 2024-08-22 RX ADMIN — ONDANSETRON 4 MG: 2 INJECTION INTRAMUSCULAR; INTRAVENOUS at 15:32

## 2024-08-22 RX ADMIN — HYDROMORPHONE HYDROCHLORIDE 0.4 MG: 0.2 INJECTION, SOLUTION INTRAMUSCULAR; INTRAVENOUS; SUBCUTANEOUS at 16:12

## 2024-08-22 ASSESSMENT — ACTIVITIES OF DAILY LIVING (ADL)
ADLS_ACUITY_SCORE: 35

## 2024-08-22 ASSESSMENT — LIFESTYLE VARIABLES: TOBACCO_USE: 1

## 2024-08-22 NOTE — ANESTHESIA PROCEDURE NOTES
Airway       Patient location during procedure: OR       Procedure Start/Stop Times: 8/22/2024 2:42 PM  Staff -        Anesthesiologist:  Gus Cazares MD       CRNA: Lidia Dawson APRN CRNA       Other Anesthesia Staff: Jud Mckinnon       Performed By: SRNA  Consent for Airway        Urgency: elective  Indications and Patient Condition       Indications for airway management: deon-procedural       Induction type:intravenous       Mask difficulty assessment: 1 - vent by mask    Final Airway Details       Final airway type: endotracheal airway       Successful airway: ETT - single  Endotracheal Airway Details        ETT size (mm): 8.0       Cuffed: yes       Successful intubation technique: video laryngoscopy       VL Blade Size: Glidescope 4       Grade View of Cords: 1       Adjucts: stylet       Position: Right       Measured from: lips       Secured at (cm): 23       Bite block used: None    Post intubation assessment        Placement verified by: capnometry, equal breath sounds and chest rise        Number of attempts at approach: 1       Number of other approaches attempted: 0       Secured with: tape       Ease of procedure: easy       Dentition: Intact and Unchanged    Medication(s) Administered   Medication Administration Time: 8/22/2024 2:42 PM

## 2024-08-22 NOTE — DISCHARGE INSTRUCTIONS
Same Day Surgery Discharge Instructions for  Sedation and General Anesthesia     It's not unusual to feel dizzy, light-headed or faint for up to 24 hours after surgery or while taking pain medication.  If you have these symptoms: sit for a few minutes before standing and have someone assist you when you get up to walk or use the bathroom.    You should rest and relax for the next 24 hours. We recommend you make arrangements to have an adult stay with you for at least 24 hours after your discharge.  Avoid hazardous and strenuous activity.    DO NOT DRIVE any vehicle or operate mechanical equipment for 24 hours following the end of your surgery.  Even though you may feel normal, your reactions may be affected by the medication you have received.    Do not drink alcoholic beverages for 24 hours following surgery.     Slowly progress to your regular diet as you feel able. It's not unusual to feel nauseated and/or vomit after receiving anesthesia.  If you develop these symptoms, drink clear liquids (apple juice, ginger ale, broth, 7-up, etc. ) until you feel better.  If your nausea and vomiting persists for 24 hours, please notify your surgeon.      All narcotic pain medications, along with inactivity and anesthesia, can cause constipation. Drinking plenty of liquids and increasing fiber intake will help.    For any questions of a medical nature, call your surgeon.    Do not make important decisions for 24 hours.    If you had general anesthesia, you may have a sore throat for a couple of days related to the breathing tube used during surgery.  You may use Cepacol lozenges to help with this discomfort.  If it worsens or if you develop a fever, contact your surgeon.     If you feel your pain is not well managed with the pain medications prescribed by your surgeon, please contact your surgeon's office to let them know so they can address your concerns.     **If you have questions or concerns about your procedure,   call  Dr. Beltran at [phone]**

## 2024-08-22 NOTE — OR NURSING
Spoke with Dr. Beltran and she states patient may discharge home since he is voiding some and we will instruct him to go to ER if he feels he is not continuing to void well. Patient and wife verbalized understanding.

## 2024-08-22 NOTE — PROGRESS NOTES
Dr. Beltran called to notify pt is unable to void but wants to go home.  Per Dr. Beltran, pt is strongly encouraged to void prior to discharging or go home w/ tamayo and return to office for pull and void trial.

## 2024-08-22 NOTE — OP NOTE
OPERATIVE NOTE    PERIOPERATIVE DIAGNOSIS: Mixed thrombosed incarcerated hemorrhoids    POSTOPERATIVE DIAGNOSIS: Same     PROCEDURE: 3 quadrant hemorrhoidectomy     SURGEON:  Monica Beltran MD    CO-SURGEON: None     ANESTHESIA: General Endotracheal anesthesia and 30 cc of quarter percent Marcaine with 1-200,000 epinephrine     INDICATIONS FOR PROCEDURE: Mal is a 43-year-old man whose had intermittent hemorrhoidal protrusion swelling and irritation on and off for about 10 years.  3 to 4 days ago he had protrusion and was unable to reduce this.  He is had progressive pain.  He was seen in the office yesterday and opted for conservative measures but had worsening pain overnight and then wished to have them surgically excised.  He had an opportunity to discussed the risk benefits and alternatives risks including bleeding, infection, recurrence, alteration bowel control, need for further procedures, and expected recovery.  He seemed to understand and wished to proceed     FINDINGS: There were bulky 3 quadrant mixed thrombosed incarcerated hemorrhoids that could not easily be reduced even under anesthesia.  There was an additional area of thrombosis anteriorly that was removed.  No other lesions were noted.     DESCRIPTION OF PROCEDURE: After informed consent was obtained patient was taken the operating room and intubated.  He was positioned in the prone jackknife position and perianal region was taped prepped in the usual fashion.  After appropriate timeout we began by injecting 20 of the 30 cc of Marcaine into the subcutaneous and deeper tissues to perform a block.  Gentle pressure was held to reduce the edema of the external component and internal component which allowed me to then reduce the bulky tissues.  After holding gentle pressure we able to see that there were 3 quadrants with mixed thrombosed hemorrhoids.  Bradshaw bivalve was inserted followed by a Fansler retractor given the bulky nature of his thrombosis and  hemorrhoids.    The largest was on the right lateral asked.  We positioned the Fansler and retracted the and external skin.  An ellipse of skin was incised and elevated up off of the sphincter complex caring our dissection down beyond the dentate line more proximally to the apex.  The apex was clamped with a Schnidt clamp and the tissue was passed off as a specimen.  A 3-0 Vicryl was used to control the pedicle.  I undermined a little bit on each side as there was some additional thrombosis underneath the mucosa but I wanted to leave enough mucosa to be able to reapproximate the tissues.  A running locking 3-0 Vicryl was used to reapproximate the mucosa with continuation of the simple running on the skin.  The suture line was then run back down and we sutured to the apex.  The suture line was clear.  In a similar fashion the left lateral and the right posterior excised and controlled with running 3-0 Vicryl sutures.  At the completion of this there was still a small amount of subcutaneous clot anteriorly that was incised and 3-0 Vicryl was used to reapproximate the skin.    A Bradshaw bivalve was reinserted and we inspected each suture line which was nicely hemostatic.  The rectum was irrigated and hemostasis was excellent.  Dry gauze dressing was placed externally after injecting the last 10 cc of Marcaine around the surgical incisions.    Estimated blood loss: 20 cc     COMPLICATIONS: No immediate complications    SPECIMENS: Hemorrhoids     Monica Beltran MD

## 2024-08-22 NOTE — ANESTHESIA CARE TRANSFER NOTE
Patient: Jona Lopez    Procedure: Procedure(s):  EXAM UNDER ANESTHESIA RECTUM, HEMORRHOIDECTOMY       Diagnosis: External hemorrhoid [K64.4]  Diagnosis Additional Information: No value filed.    Anesthesia Type:   General     Note:    Oropharynx: oropharynx clear of all foreign objects and spontaneously breathing  Level of Consciousness: drowsy and awake  Oxygen Supplementation: face mask  Level of Supplemental Oxygen (L/min / FiO2): 6  Independent Airway: airway patency satisfactory and stable  Dentition: dentition unchanged  Vital Signs Stable: post-procedure vital signs reviewed and stable  Report to RN Given: handoff report given  Patient transferred to: PACU    Handoff Report: Identifed the Patient, Identified the Reponsible Provider, Reviewed the pertinent medical history, Discussed the surgical course, Reviewed Intra-OP anesthesia mangement and issues during anesthesia, Set expectations for post-procedure period and Allowed opportunity for questions and acknowledgement of understanding      Vitals:  Vitals Value Taken Time   /97    Temp     Pulse 60 08/22/24 1547   Resp 7 08/22/24 1547   SpO2 100 % 08/22/24 1547   Vitals shown include unfiled device data.    Electronically Signed By: TARIK Kaminski CRNA  August 22, 2024  3:48 PM

## 2024-08-22 NOTE — OR NURSING
Patient ambulating and voided 100 ml. Patient then bladder scanned reading 389 ml. Dr. Beltran contacted but not reachable at this time. Patient is refusing tamayo catheter and states he wants to discharge home as he feels he will be able to void well at home.

## 2024-08-22 NOTE — ANESTHESIA PREPROCEDURE EVALUATION
"Anesthesia Pre-Procedure Evaluation    Patient: Jona Lopez   MRN: [id number] : 1980        Procedure : Procedure(s):  EXAM UNDER ANESTHESIA RECTUM, HEMORRHOIDECTOMY          History reviewed. No pertinent past medical history.   Past Surgical History:   Procedure Laterality Date    GENITOURINARY SURGERY      kidney stones      No Known Allergies   Social History     Tobacco Use    Smoking status: Former     Current packs/day: 3.00     Average packs/day: 3.0 packs/day for 5.0 years (15.0 ttl pk-yrs)     Types: Cigarettes    Smokeless tobacco: Never   Substance Use Topics    Alcohol use: Yes     Comment: rarely      Wt Readings from Last 1 Encounters:   24 96.1 kg (211 lb 14.4 oz)        Anesthesia Evaluation   Pt has had prior anesthetic.     History of anesthetic complications  - PONV.      ROS/MED HX  ENT/Pulmonary:     (+)                tobacco use, Past use,                    (-) sleep apnea   Neurologic:       Cardiovascular:       METS/Exercise Tolerance:     Hematologic:       Musculoskeletal:       GI/Hepatic:    (-) GERD   Renal/Genitourinary:     (+)       Nephrolithiasis ,       Endo:       Psychiatric/Substance Use:       Infectious Disease:       Malignancy:       Other:            Physical Exam    Airway        Mallampati: II   TM distance: > 3 FB   Neck ROM: full   Mouth opening: > 3 cm    Respiratory Devices and Support         Dental       (+) Minor Abnormalities - some fillings, tiny chips      Cardiovascular   cardiovascular exam normal          Pulmonary   pulmonary exam normal                OUTSIDE LABS:  CBC: No results found for: \"WBC\", \"HGB\", \"HCT\", \"PLT\"  BMP: No results found for: \"NA\", \"POTASSIUM\", \"CHLORIDE\", \"CO2\", \"BUN\", \"CR\", \"GLC\"  COAGS: No results found for: \"PTT\", \"INR\", \"FIBR\"  POC: No results found for: \"BGM\", \"HCG\", \"HCGS\"  HEPATIC: No results found for: \"ALBUMIN\", \"PROTTOTAL\", \"ALT\", \"AST\", \"GGT\", \"ALKPHOS\", \"BILITOTAL\", \"BILIDIRECT\", \"HOANG\"  OTHER: No " "results found for: \"PH\", \"LACT\", \"A1C\", \"BEATRICE\", \"PHOS\", \"MAG\", \"LIPASE\", \"AMYLASE\", \"TSH\", \"T4\", \"T3\", \"CRP\", \"SED\"    Anesthesia Plan    ASA Status:  2    NPO Status:  NPO Appropriate    Anesthesia Type: General.     - Airway: ETT   Induction: Intravenous, Propofol.   Maintenance: TIVA.   Techniques and Equipment:     - Airway: Video-Laryngoscope       Consents    Anesthesia Plan(s) and associated risks, benefits, and realistic alternatives discussed. Questions answered and patient/representative(s) expressed understanding.     - Discussed:     - Discussed with:  Patient            Postoperative Care    Pain management: IV analgesics, Oral pain medications.   PONV prophylaxis: Ondansetron (or other 5HT-3), Dexamethasone or Solumedrol, Scopolamine patch, Background Propofol Infusion     Comments:               Gus Cazares MD    I have reviewed the pertinent notes and labs in the chart from the past 30 days and (re)examined the patient.  Any updates or changes from those notes are reflected in this note.              # Obesity: Estimated body mass index is 32.22 kg/m  as calculated from the following:    Height as of this encounter: 1.727 m (5' 8\").    Weight as of this encounter: 96.1 kg (211 lb 14.4 oz).      "

## 2024-08-22 NOTE — OR NURSING
Dr. Cazares MDA done with pre-op eval, Dr. Beltran preforming pre-op H and P and pre-surgical eval.

## 2024-08-22 NOTE — H&P
Pre-Surgery History and Physical     Jona Lopez MRN# [id number]   YOB: 1980 Age: 43 year old     Date of Procedure: 8/22/2024  Primary care provider: Rosanne Aguirre  Type of surgery: Hemorrhoidectomy  Indication: mixed incarcerated hemorrhoids  Type of Anesthesia Anticipated: General Endotracheal anesthesia and a pudendal block    HPI:    Jona is a 43 year old male who has had a history of intermittent protrusion, bleeding, pain related to prolapsing internal and thrombosed external hemorrhoids.  He has severe episode about 2 years ago that responded with conservative measures.  Yesterday he presented to our office with 4 days of severe pain and protrusion that could not be reduced.  He initially sought conservative measures that had worked for him in the past but today we presented with worsening pain.    He denies fevers or chills.  He generally has a bowel movement daily but is had a hard time going over the past 48 hours.  He does sit on the toilet for extended periods of time.  He tries not to strain.  He reports a high-fiber diet and plenty of fluid.    He had a colonoscopy in 2015 and was recommended a 5-year recall.  He has yet to do this.    He has a remote history of smoking but quit several years ago.  He does occasionally smoke cigars.    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    A history and physical has been performed. The patient's medications and allergies have been reviewed. The risks and benefits of the procedure and the sedation options and risks were discussed with the patient.  All questions were answered and informed consent was obtained.      He denies a personal or family history of anesthesia complications or bleeding disorders.     No Known Allergies     Prior to Admission Medications   Prescriptions Last Dose Informant Patient Reported? Taking?   ciprofloxacin-dexamethasone (CIPRODEX) 0.3-0.1 % otic suspension More than a month  No Yes   Sig: Place 4  "drops into both ears 2 times daily   hydrocortisone (ANUSOL-HC) 2.5 % cream 8/22/2024  No Yes   Sig: Place rectally 2 times daily For up to 2 weeks.      Facility-Administered Medications: None       Patient Active Problem List   Diagnosis    Disorder of skin or subcutaneous tissue        History reviewed. No pertinent past medical history.     Past Surgical History:   Procedure Laterality Date    GENITOURINARY SURGERY      kidney stones       Social History     Tobacco Use    Smoking status: Former     Current packs/day: 3.00     Average packs/day: 3.0 packs/day for 5.0 years (15.0 ttl pk-yrs)     Types: Cigarettes    Smokeless tobacco: Never   Substance Use Topics    Alcohol use: Yes     Comment: rarely       Family History   Problem Relation Age of Onset    Heart Disease Maternal Grandfather     Heart Disease Paternal Grandfather        REVIEW OF SYSTEMS:     5 point ROS negative except as noted above in HPI, including Gen., Resp., CV, GI &  system review.      PHYSICAL EXAM:   /76   Pulse 56   Temp 97.8  F (36.6  C) (Temporal)   Resp 18   Ht 1.727 m (5' 8\")   Wt 96.1 kg (211 lb 14.4 oz)   SpO2 98%   BMI 32.22 kg/m   Estimated body mass index is 32.22 kg/m  as calculated from the following:    Height as of this encounter: 1.727 m (5' 8\").    Weight as of this encounter: 96.1 kg (211 lb 14.4 oz).   GENERAL APPEARANCE: healthy and alert  MENTAL STATUS: alert    RESP: lungs clear to auscultation - no rales, rhonchi or wheezes  CV: regular rates and rhythm  Perianal exam: There is mixed circumferential prolapsed hemorrhoids with potential thrombosis.  This was exquisitely tender and no other exam was performed.    DIAGNOSTICS:    Not indicated      IMPRESSION   ASA Class 1 - Healthy patient, no medical problems        PLAN:       Jona is a 43-year-old with hemorrhoid crisis with mixed incarcerated hemorrhoids.  No obvious necrosis is seen but I recommended exam under anesthesia with hemorrhoidectomy. "  We reviewed that this is a painful procedure and is associated with a 2 to 6-week recovery.  We reviewed the risks of bleeding, infection, alteration of bowel control, recurrence, and expected recovery.  We discussed that he should enjoy a high-fiber diet with plenty of fluids and minimize toilet time to less than 3 to 5 minutes and avoid straining.  He was in agreement with the plan and wished to proceed.      Signed Electronically by: Monica Beltran MD, MD  August 22, 2024

## 2024-08-22 NOTE — ANESTHESIA POSTPROCEDURE EVALUATION
Patient: Jona Lopez    Procedure: Procedure(s):  EXAM UNDER ANESTHESIA RECTUM, HEMORRHOIDECTOMY       Anesthesia Type:  General    Note:  Disposition: Outpatient   Postop Pain Control: Uneventful            Sign Out: Well controlled pain   PONV: No   Neuro/Psych: Uneventful            Sign Out: Acceptable/Baseline neuro status   Airway/Respiratory: Uneventful            Sign Out: Acceptable/Baseline resp. status   CV/Hemodynamics: Uneventful            Sign Out: Acceptable CV status   Other NRE: NONE   DID A NON-ROUTINE EVENT OCCUR? No           Last vitals:  Vitals Value Taken Time   /84 08/22/24 1606   Temp 36.2  C (97.1  F) 08/22/24 1546   Pulse 64 08/22/24 1609   Resp 23 08/22/24 1609   SpO2 98 % 08/22/24 1609   Vitals shown include unfiled device data.    Electronically Signed By: Gus Cazares MD  August 22, 2024  4:10 PM

## 2024-08-23 NOTE — OR NURSING
Dr. Beltran notified that patient is unable to void and states patient needs indwelling tamayo catheter if he is not able to empty his bladder to prevent injury to bladder. Patient and wife informed of this.

## 2024-08-23 NOTE — OR NURSING
"After further thought patient then decided he would like to receive a catheter. Urojet administered and tamayo catheter inserted using sterile technique. Patient extremely anxious leading up to catheter insertion and patient became extremely tense with catheter insertion screaming in pain. Catheter fully inserted with no return of urine. Patient screaming \"take it out, take it out, no more\" so this RN removed catheter promptly. Dr. Beltran called, but unreachable at this time. Patient refused further care and wanted to exit immediately. Wife at bedside throughout this process. Patient and wife verbalized again that they will go to the ER if voiding is an issue at home. Patient brought to car by wheelchair.    "

## 2024-08-28 LAB
PATH REPORT.COMMENTS IMP SPEC: NORMAL
PATH REPORT.COMMENTS IMP SPEC: NORMAL
PATH REPORT.FINAL DX SPEC: NORMAL
PATH REPORT.GROSS SPEC: NORMAL
PATH REPORT.MICROSCOPIC SPEC OTHER STN: NORMAL
PATH REPORT.RELEVANT HX SPEC: NORMAL
PHOTO IMAGE: NORMAL

## (undated) DEVICE — DRAPE MINOR PROCEDURE LAP 29496

## (undated) DEVICE — PACK MINOR SBA15MIFSE

## (undated) DEVICE — SOL WATER IRRIG 1000ML BOTTLE 2F7114

## (undated) DEVICE — TUBING SUCTION MEDI-VAC SOFT 3/16"X20' N520A

## (undated) DEVICE — NDL 27GA 1.25" 305136

## (undated) DEVICE — SU VICRYL 3-0 SH 27" J316H

## (undated) DEVICE — SYR BULB IRRIG DOVER 60 ML LATEX FREE 67000

## (undated) DEVICE — SYR 10ML FINGER CONTROL W/O NDL 309695

## (undated) DEVICE — SPONGE RAY-TEC 3X3" 30-094

## (undated) DEVICE — LINEN TOWEL PACK X5 5464

## (undated) DEVICE — ESU PENCIL W/SMOKE EVAC NEPTUNE STRYKER 0703-046-000

## (undated) DEVICE — DECANTER VIAL 2006S

## (undated) RX ORDER — HYDROMORPHONE HCL IN WATER/PF 6 MG/30 ML
PATIENT CONTROLLED ANALGESIA SYRINGE INTRAVENOUS
Status: DISPENSED
Start: 2024-08-22

## (undated) RX ORDER — FENTANYL CITRATE 0.05 MG/ML
INJECTION, SOLUTION INTRAMUSCULAR; INTRAVENOUS
Status: DISPENSED
Start: 2024-08-22

## (undated) RX ORDER — BUPIVACAINE HYDROCHLORIDE AND EPINEPHRINE 2.5; 5 MG/ML; UG/ML
INJECTION, SOLUTION EPIDURAL; INFILTRATION; INTRACAUDAL; PERINEURAL
Status: DISPENSED
Start: 2024-08-22

## (undated) RX ORDER — DEXAMETHASONE SODIUM PHOSPHATE 4 MG/ML
INJECTION, SOLUTION INTRA-ARTICULAR; INTRALESIONAL; INTRAMUSCULAR; INTRAVENOUS; SOFT TISSUE
Status: DISPENSED
Start: 2024-08-22

## (undated) RX ORDER — OXYCODONE HYDROCHLORIDE 5 MG/1
TABLET ORAL
Status: DISPENSED
Start: 2024-08-22

## (undated) RX ORDER — SCOLOPAMINE TRANSDERMAL SYSTEM 1 MG/1
PATCH, EXTENDED RELEASE TRANSDERMAL
Status: DISPENSED
Start: 2024-08-22

## (undated) RX ORDER — ONDANSETRON 2 MG/ML
INJECTION INTRAMUSCULAR; INTRAVENOUS
Status: DISPENSED
Start: 2024-08-22

## (undated) RX ORDER — LIDOCAINE HYDROCHLORIDE 20 MG/ML
JELLY TOPICAL
Status: DISPENSED
Start: 2024-08-22

## (undated) RX ORDER — FENTANYL CITRATE 50 UG/ML
INJECTION, SOLUTION INTRAMUSCULAR; INTRAVENOUS
Status: DISPENSED
Start: 2024-08-22